# Patient Record
Sex: FEMALE | Race: WHITE | Employment: OTHER | URBAN - METROPOLITAN AREA
[De-identification: names, ages, dates, MRNs, and addresses within clinical notes are randomized per-mention and may not be internally consistent; named-entity substitution may affect disease eponyms.]

---

## 2019-01-18 ENCOUNTER — HOSPITAL ENCOUNTER (OUTPATIENT)
Dept: MAMMOGRAPHY | Age: 64
Discharge: HOME | End: 2019-01-18
Attending: NURSE PRACTITIONER
Payer: MEDICAID

## 2019-01-18 DIAGNOSIS — Z12.31 VISIT FOR SCREENING MAMMOGRAM: ICD-10-CM

## 2019-01-18 PROCEDURE — 77063 BREAST TOMOSYNTHESIS BI: CPT

## 2019-10-22 ENCOUNTER — OFFICE VISIT (OUTPATIENT)
Dept: DERMATOLOGY | Facility: AMBULATORY SURGERY CENTER | Age: 64
End: 2019-10-22

## 2019-10-22 VITALS
HEIGHT: 62 IN | SYSTOLIC BLOOD PRESSURE: 116 MMHG | HEART RATE: 115 BPM | DIASTOLIC BLOOD PRESSURE: 70 MMHG | OXYGEN SATURATION: 98 % | RESPIRATION RATE: 16 BRPM | TEMPERATURE: 98.8 F

## 2019-10-22 DIAGNOSIS — B35.1 ONYCHOMYCOSIS: ICD-10-CM

## 2019-10-22 DIAGNOSIS — L82.1 SEBORRHEIC KERATOSES: Primary | ICD-10-CM

## 2019-10-22 DIAGNOSIS — L81.4 LENTIGINES: ICD-10-CM

## 2019-10-22 DIAGNOSIS — Z12.83 SCREENING FOR MALIGNANT NEOPLASM OF SKIN: ICD-10-CM

## 2019-10-22 DIAGNOSIS — D22.9 MULTIPLE BENIGN NEVI: ICD-10-CM

## 2019-10-22 DIAGNOSIS — D18.01 CHERRY ANGIOMA: ICD-10-CM

## 2019-10-22 DIAGNOSIS — D23.9 DERMATOFIBROMA: ICD-10-CM

## 2019-10-22 DIAGNOSIS — L91.8 SKIN TAG: ICD-10-CM

## 2019-10-22 RX ORDER — FLUTICASONE PROPIONATE 50 MCG
2 SPRAY, SUSPENSION (ML) NASAL DAILY
COMMUNITY

## 2019-10-22 RX ORDER — CHOLECALCIFEROL (VITAMIN D3) 125 MCG
220 CAPSULE ORAL AS NEEDED
COMMUNITY

## 2019-10-22 NOTE — PROGRESS NOTES
Written by Juanita Parikh, as dictated by Evelina Erickson, [address]. Name: Pablo Negrete       Age: 60 y.o. Date: 10/22/2019    Chief Complaint:   Chief Complaint   Patient presents with    Skin Exam     NP, full exam.        Subjective:    HPI  Ms. Pablo Negrete is a 60 y.o. female who presents as a new patient to [place] [address] for a skin exam.  The patient was referred for this evaluation by self. The patient has not had a skin exam in the past and the patient does have current complaints related to her skin. She states that she has a spot on her left lower arm, right thigh, and right cheek. She states that she does not have any bleeding and itching with these spots. She also states that she has a flaky spot on her upper left arm that she is concerned about. She experiences itching with this spot, but no bleeding. She also notes a bothersome skin tag on the right axilla that she would like removed. She is feeling well and in her usual state of health today. She has no current illnesses, no other skin concerns. Her allergies, medications, medical, and social history are reviewed by me today. The patient's pertinent skin history includes: No personal or family history, no significant sun exposure throughout life    ROS: Constitutional: Negative.     Dermatological : positive for - skin lesion changes    Social History     Socioeconomic History    Marital status: UNKNOWN     Spouse name: Not on file    Number of children: Not on file    Years of education: Not on file    Highest education level: Not on file   Occupational History    Not on file   Social Needs    Financial resource strain: Not on file    Food insecurity:     Worry: Not on file     Inability: Not on file    Transportation needs:     Medical: Not on file     Non-medical: Not on file   Tobacco Use    Smoking status: Current Every Day Smoker     Packs/day: 0.25     Years: 40.00     Pack years: 10.00    Smokeless tobacco: Never Used    Tobacco comment: Pt smoked on and off over 40 years. Substance and Sexual Activity    Alcohol use: Not Currently    Drug use: Never    Sexual activity: Not on file   Lifestyle    Physical activity:     Days per week: Not on file     Minutes per session: Not on file    Stress: Not on file   Relationships    Social connections:     Talks on phone: Not on file     Gets together: Not on file     Attends Evangelical service: Not on file     Active member of club or organization: Not on file     Attends meetings of clubs or organizations: Not on file     Relationship status: Not on file    Intimate partner violence:     Fear of current or ex partner: Not on file     Emotionally abused: Not on file     Physically abused: Not on file     Forced sexual activity: Not on file   Other Topics Concern    Not on file   Social History Narrative    Not on file       Family History   Problem Relation Age of Onset    Breast Cancer Sister         half sister, unsure of age       History reviewed. No pertinent past medical history. Past Surgical History:   Procedure Laterality Date    BREAST SURGERY PROCEDURE UNLISTED      biopsy    HX  SECTION      HX GYN Right     ovary           Allergies   Allergen Reactions    Penicillins Anaphylaxis     As a child         Objective:    Visit Vitals  /70 (BP 1 Location: Right arm, BP Patient Position: Sitting)   Pulse (!) 115   Temp 98.8 °F (37.1 °C) (Oral)   Resp 16   Ht 5' 2\" (1.575 m)   SpO2 98%       Kimberly Shea is a 60 y.o. female who appears well and in no distress. She is awake, alert, and oriented. A skin examination was performed including her scalp, face (including eyelid), ears, neck, chest, back, abdomen, upper extremities (including digits/nails), lower extremities, breasts, buttocks; genital skin was not examined.     She has scattered waxy macules and keratotic papules consistent with seborrheic keratoses, including lesions of her concern. There are lentigines on sun exposed areas. She has scattered red papules consistent with cherry angiomas. She has pink and brown firm papule most consistent with dermatofibroma on the left thigh. She has a skin toned pedunculated papule on the right axilla most consistent with a skin tag. She has pink intradermal nevi and brown junctional nevi, no concerning features for severe atypia. She has a thickened yellow toenail on her right great toenail most consistent with onychomycosis. Assessment/Plan:  1. Seborrheic keratoses. The diagnosis was reviewed and the patient was reassured that no treatment is needed for these benign lesions. 2. Solar lentigos. The diagnosis and relationship to sun exposure was reviewed. Sun protection advised. 3. Skin tags, right underarm. The diagnosis was discussed. The diagnosis was reviewed. A snip removal was advised to address this lesion. The procedure was reviewed and written and verbal consent were obtained. The risks of pain, bleeding, infection, recurrence and scar were discussed. I performed the procedure. The site was cleansed. A snip removal was performed to remove the lesion in its clinical entirety. Drysol was used for hemostasis. The wound was bandaged and care reviewed.      4. Cherry angiomas. The diagnosis was reviewed and the patient was reassured that no treatment is needed for these benign lesions. 5. Dermatofibroma. The diagnosis was reviewed and the patient was reassured that no treatment is needed for these benign conditions at this time. The patient should follow up should the lesion change or become symptomatic. 6. Normal nevi. The diagnosis of normal nevi was reviewed. I discussed sun protection, sunscreen use, the warning signs of skin cancer, mole monitoring, the need for self-skin examinations, and the need for regular practitioner exams.   The patient should follow up sooner as needed if new, changing, or symptomatic skin lesions arise. 7. Onychomycosis. Recommended the use of yellow Listerine or apple cider vinegar. Next skin exam :  Every few years    This plan was reviewed with the patient and patient agrees. All questions were answered. This scribe documentation was reviewed by me and accurately reflects the examination and decisions made by me. [facility]   OFFICE PROCEDURE PROGRESS NOTE   Chart reviewed for the following:   Navi HEIN, have reviewed the History, Physical and updated the Allergic reactions for Mala Rued. TIME OUT performed immediately prior to start of procedure:   Treva HEIN, have performed the following reviews on Mala Rued   prior to the start of the procedure:     * Patient was identified by name and date of birth   * Agreement on procedure being performed was verified   * Risks and Benefits explained to the patient   * Procedure site verified and marked as necessary   * Patient was positioned for comfort   * Consent was signed and verified     Time: 3:30 PM  Date of procedure: 10/22/2019  Procedure performed by: Yessi Gilbert.  Star Dempsey DNP  Provider assisted by: self  Patient assisted by: self   How tolerated by patient: tolerated the procedure well with no complications   Comments: none

## 2019-10-22 NOTE — PROGRESS NOTES
Chief Complaint   Patient presents with    Skin Exam     NP,      1. Have you been to the ER, urgent care clinic since your last visit? Hospitalized since your last visit? No    2. Have you seen or consulted any other health care providers outside of the [address] [place] [address] since your last visit? Include any pap smears or colon screening. No     Pulse elevated, doctor notified.

## 2020-02-21 ENCOUNTER — HOSPITAL ENCOUNTER (OUTPATIENT)
Dept: MAMMOGRAPHY | Age: 65
Discharge: HOME | End: 2020-02-21
Attending: NURSE PRACTITIONER
Payer: MEDICAID

## 2020-02-21 DIAGNOSIS — Z12.31 VISIT FOR SCREENING MAMMOGRAM: ICD-10-CM

## 2020-02-21 PROCEDURE — 77063 BREAST TOMOSYNTHESIS BI: CPT

## 2021-03-01 ENCOUNTER — TRANSCRIBE ORDER (OUTPATIENT)
Dept: SCHEDULING | Age: 66
End: 2021-03-01

## 2021-03-01 DIAGNOSIS — Z12.31 VISIT FOR SCREENING MAMMOGRAM: Primary | ICD-10-CM

## 2021-04-09 ENCOUNTER — HOSPITAL ENCOUNTER (OUTPATIENT)
Dept: MAMMOGRAPHY | Age: 66
Discharge: HOME | End: 2021-04-09
Attending: NURSE PRACTITIONER
Payer: MEDICAID

## 2021-04-09 DIAGNOSIS — Z12.31 VISIT FOR SCREENING MAMMOGRAM: ICD-10-CM

## 2021-04-09 PROCEDURE — 77063 BREAST TOMOSYNTHESIS BI: CPT

## 2022-04-07 ENCOUNTER — TRANSCRIBE ORDER (OUTPATIENT)
Dept: SCHEDULING | Age: 67
End: 2022-04-07

## 2022-04-07 DIAGNOSIS — Z12.31 SCREENING MAMMOGRAM FOR HIGH-RISK PATIENT: Primary | ICD-10-CM

## 2022-05-26 ENCOUNTER — HOSPITAL ENCOUNTER (OUTPATIENT)
Dept: MAMMOGRAPHY | Age: 67
Discharge: HOME | End: 2022-05-26
Attending: NURSE PRACTITIONER
Payer: MEDICAID

## 2022-05-26 DIAGNOSIS — Z12.31 SCREENING MAMMOGRAM FOR HIGH-RISK PATIENT: ICD-10-CM

## 2022-05-26 PROCEDURE — 77063 BREAST TOMOSYNTHESIS BI: CPT

## 2022-12-05 ENCOUNTER — ANESTHESIA (OUTPATIENT)
Dept: ENDOSCOPY | Age: 67
End: 2022-12-05
Payer: MEDICARE

## 2022-12-05 ENCOUNTER — HOSPITAL ENCOUNTER (OUTPATIENT)
Age: 67
Setting detail: OUTPATIENT SURGERY
Discharge: HOME | End: 2022-12-05
Attending: INTERNAL MEDICINE | Admitting: INTERNAL MEDICINE
Payer: MEDICARE

## 2022-12-05 ENCOUNTER — ANESTHESIA EVENT (OUTPATIENT)
Dept: ENDOSCOPY | Age: 67
End: 2022-12-05
Payer: MEDICARE

## 2022-12-05 VITALS
WEIGHT: 134 LBS | TEMPERATURE: 98.7 F | HEART RATE: 84 BPM | RESPIRATION RATE: 20 BRPM | DIASTOLIC BLOOD PRESSURE: 71 MMHG | OXYGEN SATURATION: 100 % | BODY MASS INDEX: 24.66 KG/M2 | HEIGHT: 62 IN | SYSTOLIC BLOOD PRESSURE: 125 MMHG

## 2022-12-05 PROCEDURE — 77030013992 HC SNR POLYP ENDOSC BSC -B: Performed by: INTERNAL MEDICINE

## 2022-12-05 PROCEDURE — 74011000250 HC RX REV CODE- 250: Performed by: NURSE ANESTHETIST, CERTIFIED REGISTERED

## 2022-12-05 PROCEDURE — 88305 TISSUE EXAM BY PATHOLOGIST: CPT

## 2022-12-05 PROCEDURE — 74011250636 HC RX REV CODE- 250/636: Performed by: NURSE ANESTHETIST, CERTIFIED REGISTERED

## 2022-12-05 PROCEDURE — 77030009426 HC FCPS BIOP ENDOSC BSC -B: Performed by: INTERNAL MEDICINE

## 2022-12-05 PROCEDURE — 2709999900 HC NON-CHARGEABLE SUPPLY: Performed by: INTERNAL MEDICINE

## 2022-12-05 PROCEDURE — 76060000031 HC ANESTHESIA FIRST 0.5 HR: Performed by: INTERNAL MEDICINE

## 2022-12-05 PROCEDURE — 76040000019: Performed by: INTERNAL MEDICINE

## 2022-12-05 RX ORDER — PROPOFOL 10 MG/ML
INJECTION, EMULSION INTRAVENOUS AS NEEDED
Status: DISCONTINUED | OUTPATIENT
Start: 2022-12-05 | End: 2022-12-05 | Stop reason: HOSPADM

## 2022-12-05 RX ORDER — ATROPINE SULFATE 0.1 MG/ML
0.5 INJECTION INTRAVENOUS
Status: DISCONTINUED | OUTPATIENT
Start: 2022-12-05 | End: 2022-12-05 | Stop reason: HOSPADM

## 2022-12-05 RX ORDER — LIDOCAINE HYDROCHLORIDE 20 MG/ML
INJECTION, SOLUTION EPIDURAL; INFILTRATION; INTRACAUDAL; PERINEURAL AS NEEDED
Status: DISCONTINUED | OUTPATIENT
Start: 2022-12-05 | End: 2022-12-05 | Stop reason: HOSPADM

## 2022-12-05 RX ORDER — DEXTROMETHORPHAN/PSEUDOEPHED 2.5-7.5/.8
1.2 DROPS ORAL
Status: DISCONTINUED | OUTPATIENT
Start: 2022-12-05 | End: 2022-12-05 | Stop reason: HOSPADM

## 2022-12-05 RX ORDER — EPINEPHRINE 0.1 MG/ML
1 INJECTION INTRACARDIAC; INTRAVENOUS
Status: DISCONTINUED | OUTPATIENT
Start: 2022-12-05 | End: 2022-12-05 | Stop reason: HOSPADM

## 2022-12-05 RX ORDER — SODIUM CHLORIDE 9 MG/ML
INJECTION, SOLUTION INTRAVENOUS
Status: DISCONTINUED | OUTPATIENT
Start: 2022-12-05 | End: 2022-12-05 | Stop reason: HOSPADM

## 2022-12-05 RX ORDER — FENTANYL CITRATE 50 UG/ML
25-200 INJECTION, SOLUTION INTRAMUSCULAR; INTRAVENOUS
Status: DISCONTINUED | OUTPATIENT
Start: 2022-12-05 | End: 2022-12-05 | Stop reason: HOSPADM

## 2022-12-05 RX ORDER — FLUMAZENIL 0.1 MG/ML
0.2 INJECTION INTRAVENOUS
Status: DISCONTINUED | OUTPATIENT
Start: 2022-12-05 | End: 2022-12-05 | Stop reason: HOSPADM

## 2022-12-05 RX ORDER — MIDAZOLAM HYDROCHLORIDE 1 MG/ML
.25-5 INJECTION, SOLUTION INTRAMUSCULAR; INTRAVENOUS
Status: DISCONTINUED | OUTPATIENT
Start: 2022-12-05 | End: 2022-12-05 | Stop reason: HOSPADM

## 2022-12-05 RX ORDER — NALOXONE HYDROCHLORIDE 0.4 MG/ML
0.4 INJECTION, SOLUTION INTRAMUSCULAR; INTRAVENOUS; SUBCUTANEOUS
Status: DISCONTINUED | OUTPATIENT
Start: 2022-12-05 | End: 2022-12-05 | Stop reason: HOSPADM

## 2022-12-05 RX ORDER — SODIUM CHLORIDE 9 MG/ML
50 INJECTION, SOLUTION INTRAVENOUS CONTINUOUS
Status: DISCONTINUED | OUTPATIENT
Start: 2022-12-05 | End: 2022-12-05 | Stop reason: HOSPADM

## 2022-12-05 RX ORDER — SODIUM CHLORIDE 0.9 % (FLUSH) 0.9 %
5-40 SYRINGE (ML) INJECTION EVERY 8 HOURS
Status: DISCONTINUED | OUTPATIENT
Start: 2022-12-05 | End: 2022-12-05 | Stop reason: HOSPADM

## 2022-12-05 RX ORDER — SODIUM CHLORIDE 0.9 % (FLUSH) 0.9 %
5-40 SYRINGE (ML) INJECTION AS NEEDED
Status: DISCONTINUED | OUTPATIENT
Start: 2022-12-05 | End: 2022-12-05 | Stop reason: HOSPADM

## 2022-12-05 RX ADMIN — PROPOFOL 100 MG: 10 INJECTION, EMULSION INTRAVENOUS at 08:26

## 2022-12-05 RX ADMIN — LIDOCAINE HYDROCHLORIDE 50 MG: 20 INJECTION, SOLUTION EPIDURAL; INFILTRATION; INTRACAUDAL; PERINEURAL at 08:26

## 2022-12-05 RX ADMIN — PROPOFOL 50 MG: 10 INJECTION, EMULSION INTRAVENOUS at 08:40

## 2022-12-05 RX ADMIN — SODIUM CHLORIDE: 900 INJECTION, SOLUTION INTRAVENOUS at 08:22

## 2022-12-05 RX ADMIN — PROPOFOL 100 MG: 10 INJECTION, EMULSION INTRAVENOUS at 08:35

## 2022-12-05 NOTE — ANESTHESIA POSTPROCEDURE EVALUATION
Post-Anesthesia Evaluation and Assessment    Patient: Riaz Rodriguez MRN: [id number]  SSN: [id number]    YOB: 1955  Age: 74 y.o. Sex: female      I have evaluated the patient and they are stable and ready for discharge from the PACU. Cardiovascular Function/Vital Signs  Visit Vitals  /71   Pulse 84   Temp 37.1 °C (98.7 °F)   Resp 20   Ht 5' 2\" (1.575 m)   Wt 60.8 kg (134 lb)   SpO2 100%   Breastfeeding No   BMI 24.51 kg/m²       Patient is status post MAC anesthesia for Procedure(s):  COLONOSCOPY  ENDOSCOPIC POLYPECTOMY. Nausea/Vomiting: None    Postoperative hydration reviewed and adequate. Pain:  Pain Scale 1: Numeric (0 - 10) (12/05/22 0922)  Pain Intensity 1: 4 (12/05/22 5321)   Managed    Neurological Status: At baseline    Mental Status, Level of Consciousness: Alert and  oriented to person, place, and time    Pulmonary Status:   O2 Device: None (Room air) (12/05/22 6218)   Adequate oxygenation and airway patent    Complications related to anesthesia: None    Post-anesthesia assessment completed. No concerns    Signed By: Beth Swan MD     December 5, 2022              Procedure(s):  COLONOSCOPY  ENDOSCOPIC POLYPECTOMY. MAC    <BSHSIANPOST>    INITIAL Post-op Vital signs:   Vitals Value Taken Time   /71 12/05/22 0922   Temp 37.1 °C (98.7 °F) 12/05/22 0850   Pulse 77 12/05/22 0924   Resp 16 12/05/22 0924   SpO2 100 % 12/05/22 0924   Vitals shown include unvalidated device data.

## 2022-12-05 NOTE — PROCEDURES
[address], [address] [place] [address]      Colonoscopy Operative Report    Dmitri Dunbar  [id number]  1955      Procedure Type:   Colonoscopy with polypectomy (snare cautery)     Indications:    Screening colonoscopy   First one     Pre-operative Diagnosis: see indication above    Post-operative Diagnosis:  See findings below    :  Halina Dalton MD    Surgical Assistant: Endoscopy Technician-1: Neri Sharpe  Endoscopy RN-1: Maryam Patel RN    Implants:  None    Referring Provider: Augie Barnes NP      Sedation:  MAC anesthesia Propofol      Procedure Details:  After informed consent was obtained with all risks and benefits of procedure explained and preoperative exam completed, the patient was taken to the endoscopy suite and placed in the left lateral decubitus position. Upon sequential sedation as per above, a digital rectal exam was performed demonstrating internal hemorrhoids. The Olympus videocolonoscope  was inserted in the rectum and carefully advanced to the cecum, which was identified by the ileocecal valve and appendiceal orifice. The cecum was identified by the ileocecal valve and appendiceal orifice. The quality of preparation was good. The colonoscope was slowly withdrawn with careful evaluation between folds. Retroflexion in the rectum was completed . Findings:   Rectum: 2 sessile polyps, around 1 cm in size each, removed by hot biopsies  Grade 1 internal hemorrhoids    Sigmoid: mild diverticulosis    1 cm sessile polyp in mid sigmoid colon, removed by hot biopsies    1.5 cm pedunculated polyp in distal sigmoid colon, located at 25 cm from anus, removed by hot snaring    Descending Colon: normal  Transverse Colon: normal  Ascending Colon: normal  Cecum: normal    Specimen Removed:   as above    Complications: None. EBL:  None. Impression:     see findings    Recommendations: --Await pathology.       Recommendation for next colonscopy in 3 years  High fiber diet    Signed By: Homar Gallagher MD     12/5/2022  8:49 AM

## 2022-12-05 NOTE — PROGRESS NOTES
Went and informed Dr Tammie Bajwa and & Dr Irene Xavier of pt's abdominal pain 5/10. Pt started rating at 8 out of 10. Pt encouraged to change position and to drink a gingerale to relieve air. Pt's abdomen is soft.   Discharge instructions reviewed pt verbalized understanding

## 2022-12-05 NOTE — PROGRESS NOTES

## 2022-12-05 NOTE — ANESTHESIA PREPROCEDURE EVALUATION
Relevant Problems   No relevant active problems       Anesthetic History   No history of anesthetic complications            Review of Systems / Medical History  Patient summary reviewed, nursing notes reviewed and pertinent labs reviewed    Pulmonary  Within defined limits                 Neuro/Psych   Within defined limits           Cardiovascular                  Exercise tolerance: >4 METS     GI/Hepatic/Renal                Endo/Other             Other Findings              Physical Exam    Airway  Mallampati: I  TM Distance: > 6 cm  Neck ROM: normal range of motion   Mouth opening: Normal     Cardiovascular    Rhythm: regular           Dental  No notable dental hx       Pulmonary  Breath sounds clear to auscultation               Abdominal         Other Findings            Anesthetic Plan    ASA: 2  Anesthesia type: MAC          Induction: Intravenous  Anesthetic plan and risks discussed with: Patient

## 2022-12-05 NOTE — H&P
[address] [place] [address] [address]      History and Physical       NAME:  Yaakov Lamb   :   1955   MRN:   [id number]             History of Present Illness:  Patient is a 74 y.o. who is seen for screening colonoscopy . PMH:  Past Medical History:   Diagnosis Date    Adverse effect of anesthesia     ? anectine caused itching    Arthritis     left thumb/right shoulder       PSH:  Past Surgical History:   Procedure Laterality Date    HX BREAST BIOPSY Left     Surgical Bx - >25 yrs ago - Benign - per pt    HX BREAST BIOPSY Left 10/31/2016    US Guided Bx - Benign - per pt Schwannoma Cell Tumor    HX  SECTION      HX DILATION AND CURETTAGE      eptopic pregnancy \"came out\" at time of D and C    HX GYN Right     ovary    HX ORTHOPAEDIC Right 2004    surgery to repair laceration pinky finger    HX PELVIC LAPAROSCOPY      MA BREAST SURGERY PROCEDURE UNLISTED      biopsy       Allergies: Allergies   Allergen Reactions    Penicillins Anaphylaxis     As a child    Anectine [Succinylcholine Chloride] Rash     Woke up from surgery in restraints due to itching; was told she was having allergic reaction to anectine. Home Medications:  Prior to Admission Medications   Prescriptions Last Dose Informant Patient Reported? Taking?   fluticasone propionate (FLONASE) 50 mcg/actuation nasal spray 2022  Yes Yes   Si Sprays by Both Nostrils route daily.       Facility-Administered Medications: None       Hospital Medications:  Current Facility-Administered Medications   Medication Dose Route Frequency    0.9% sodium chloride infusion  50 mL/hr IntraVENous CONTINUOUS    sodium chloride (NS) flush 5-40 mL  5-40 mL IntraVENous Q8H    sodium chloride (NS) flush 5-40 mL  5-40 mL IntraVENous PRN    midazolam (VERSED) injection 0.25-5 mg  0.25-5 mg IntraVENous Multiple    fentaNYL citrate (PF) injection  mcg   mcg IntraVENous Multiple    naloxone (NARCAN) injection 0.4 mg  0.4 mg IntraVENous Multiple    flumazeniL (ROMAZICON) 0.1 mg/mL injection 0.2 mg  0.2 mg IntraVENous Multiple    simethicone (MYLICON) 03VW/3.1HX oral drops 80 mg  1.2 mL Oral Multiple    atropine injection 0.5 mg  0.5 mg IntraVENous ONCE PRN    EPINEPHrine (ADRENALIN) 0.1 mg/mL syringe 1 mg  1 mg Endoscopically ONCE PRN     Facility-Administered Medications Ordered in Other Encounters   Medication Dose Route Frequency    0.9% sodium chloride infusion   IntraVENous CONTINUOUS       Social History:  Social History     Tobacco Use    Smoking status: Every Day     Packs/day: 0.25     Years: 40.00     Pack years: 10.00     Types: Cigarettes    Smokeless tobacco: Never    Tobacco comments:     Pt smoked on and off over 40 years. Substance Use Topics    Alcohol use: Not Currently       Family History:  Family History   Problem Relation Age of Onset    Other Mother         brain tumors - not cancer    Cancer Father         gall bladder    Heart Attack Father         multiple    Other Father         PUD/part of stomach removed/hernias    Breast Cancer Half-sister          The patient was counseled at length about the risks of stanley Covid-19 in the caio-operative and post-operative states including the recovery window of their procedure. The patient was made aware that stanley Covid-19 after a surgical procedure may worsen their prognosis for recovering from the virus and lend to a higher morbidity and or mortality risk. The patient was given the options of postponing their procedure. All of the risks, benefits, and alternatives were discussed. The patient does  wish to proceed with the procedure.     Review of Systems:      Constitutional: negative fever, negative chills, negative weight loss  Eyes:   negative visual changes  ENT:   negative sore throat, tongue or lip swelling  Respiratory:  negative cough, negative dyspnea  Cards:  negative for chest pain, palpitations, lower extremity edema  GI:   See HPI  :  negative for frequency, dysuria  Integument:  negative for rash and pruritus  Heme:  negative for easy bruising and gum/nose bleeding  Musculoskel: negative for myalgias,  back pain and muscle weakness  Neuro: negative for headaches, dizziness, vertigo  Psych:  negative for feelings of anxiety, depression       Objective:   Patient Vitals for the past 8 hrs:   BP Temp Pulse Resp SpO2 Height Weight   12/05/22 0758 133/84 98.6 °F (37 °C) 85 18 100 % 5' 2\" (1.575 m) 60.8 kg (134 lb)     No intake/output data recorded. No intake/output data recorded. EXAM:     NEURO-a&o   HEENT-wnl   LUNGS-clear    COR-regular rate and rhythym     ABD-soft , no tenderness, no rebound, good bs     EXT-no edema     Data Review     No results for input(s): WBC, HGB, HCT, PLT, HGBEXT, HCTEXT, PLTEXT in the last 72 hours. No results for input(s): NA, K, CL, CO2, BUN, CREA, GLU, PHOS, CA in the last 72 hours. No results for input(s): AP, TBIL, TP, ALB, GLOB, GGT, AML, LPSE in the last 72 hours. No lab exists for component: SGOT, GPT, AMYP, HLPSE  No results for input(s): INR, PTP, APTT, INREXT in the last 72 hours. Assessment:     Screening colonoscopy    There is no problem list on file for this patient.       Plan:     Endoscopic procedure with sedation     Signed By: Arminda Mirza MD     12/5/2022  8:24 AM

## 2022-12-05 NOTE — DISCHARGE INSTRUCTIONS
[address] [facility]    COLON DISCHARGE INSTRUCTIONS    Jonah Rene  [id number]  1955    Discomfort:  Redness at IV site- apply warm compress to area; if redness or soreness persist- contact your physician  There may be a slight amount of blood passed from the rectum  Gaseous discomfort- walking, belching will help relieve any discomfort  You may not operate a vehicle for 12 hours  You may not engage in an occupation involving machinery or appliances for rest of today  You may not drink alcoholic beverages for at least 12 hours  Avoid making any critical decisions for at least 24 hour  DIET:  You may resume your regular diet - however -  remember your colon is empty and a heavy meal will produce gas. Avoid these foods:  vegetables, fried / greasy foods, carbonated drinks     ACTIVITY:  You may  resume your normal daily activities it is recommended that you spend the remainder of the day resting -  avoid any strenuous activity. CALL M.D.   ANY SIGN OF:   Increasing pain, nausea, vomiting  Abdominal distension (swelling)  New increased bleeding (oral or rectal)  Fever (chills)  Pain in chest area  Bloody discharge from nose or mouth  Shortness of breath      Follow-up Instructions:   Call Dr. Rosy Mckeon for any questions or problems at 1 [phone] and follow up as needed   High fiber diet          ENDOSCOPY FINDINGS:   Your colonoscopy showed 4 polyp all removed and mild diverticulosis, rest of exam was normal.  Telephone # [phone]      Signed By: Rosy Mckeon MD     12/5/2022  8:53 AM

## 2023-06-15 ENCOUNTER — HOSPITAL ENCOUNTER (OUTPATIENT)
Age: 68
Discharge: HOME | End: 2023-06-18
Payer: MEDICARE

## 2023-06-15 DIAGNOSIS — Z12.31 VISIT FOR SCREENING MAMMOGRAM: ICD-10-CM

## 2023-06-15 PROCEDURE — 77063 BREAST TOMOSYNTHESIS BI: CPT

## 2024-06-21 ENCOUNTER — HOSPITAL ENCOUNTER (OUTPATIENT)
Age: 69
End: 2024-06-21
Payer: MEDICARE

## 2024-06-21 VITALS — BODY MASS INDEX: 23.92 KG/M2 | WEIGHT: 130 LBS | HEIGHT: 62 IN

## 2024-06-21 DIAGNOSIS — Z12.31 VISIT FOR SCREENING MAMMOGRAM: ICD-10-CM

## 2024-06-21 PROCEDURE — 77063 BREAST TOMOSYNTHESIS BI: CPT

## 2024-07-11 ENCOUNTER — TRANSCRIBE ORDERS (OUTPATIENT)
Facility: HOSPITAL | Age: 69
End: 2024-07-11

## 2024-07-11 ENCOUNTER — HOSPITAL ENCOUNTER (OUTPATIENT)
Facility: HOSPITAL | Age: 69
Discharge: HOME | End: 2024-07-11
Payer: MEDICARE

## 2024-07-11 ENCOUNTER — HOSPITAL ENCOUNTER (OUTPATIENT)
Facility: HOSPITAL | Age: 69
End: 2024-07-11
Payer: MEDICARE

## 2024-07-11 VITALS — BODY MASS INDEX: 23.92 KG/M2 | WEIGHT: 130 LBS | HEIGHT: 62 IN

## 2024-07-11 VITALS — HEIGHT: 62 IN | BODY MASS INDEX: 23.92 KG/M2 | WEIGHT: 130 LBS

## 2024-07-11 DIAGNOSIS — R92.8 ABNORMAL MAMMOGRAM OF RIGHT BREAST: ICD-10-CM

## 2024-07-11 DIAGNOSIS — R92.8 ABNORMAL MAMMOGRAM OF RIGHT BREAST: Primary | ICD-10-CM

## 2024-07-11 PROCEDURE — 76642 ULTRASOUND BREAST LIMITED: CPT

## 2024-07-11 PROCEDURE — 77065 DX MAMMO INCL CAD UNI: CPT

## 2024-07-11 PROCEDURE — G0279 TOMOSYNTHESIS, MAMMO: HCPCS

## 2025-01-13 ENCOUNTER — HOSPITAL ENCOUNTER (OUTPATIENT)
Facility: HOSPITAL | Age: 70
Discharge: HOME | End: 2025-01-16
Payer: MEDICARE

## 2025-01-13 VITALS — WEIGHT: 130 LBS | BODY MASS INDEX: 23.92 KG/M2 | HEIGHT: 62 IN

## 2025-01-13 DIAGNOSIS — R92.8 ABNORMAL MAMMOGRAM OF RIGHT BREAST: ICD-10-CM

## 2025-01-13 PROCEDURE — 76642 ULTRASOUND BREAST LIMITED: CPT

## 2025-01-13 PROCEDURE — G0279 TOMOSYNTHESIS, MAMMO: HCPCS

## 2025-01-31 ENCOUNTER — TRANSCRIBE ORDERS (OUTPATIENT)
Facility: HOSPITAL | Age: 70
End: 2025-01-31

## 2025-01-31 DIAGNOSIS — Z78.0 ASYMPTOMATIC MENOPAUSAL STATE: Primary | ICD-10-CM

## 2025-06-27 ENCOUNTER — TRANSCRIBE ORDERS (OUTPATIENT)
Facility: HOSPITAL | Age: 70
End: 2025-06-27

## 2025-06-27 DIAGNOSIS — R92.8 ABNORMAL MAMMOGRAM OF RIGHT BREAST: Primary | ICD-10-CM

## (undated) DEVICE — SNARE ENDOSCP M L240CM W27MM SHTH DIA2.4MM CHN 2.8MM OVL

## (undated) DEVICE — REM POLYHESIVE ADULT PATIENT RETURN ELECTRODE: Brand: VALLEYLAB

## (undated) DEVICE — FCPS BX HOT RJ4 2.2MMX240CM -- RADIAL JAW 4 BX/40

## (undated) DEVICE — TRAP SURG QUAD PARABOLA SLOT DSGN SFTY SCRN TRAPEASE